# Patient Record
(demographics unavailable — no encounter records)

---

## 2025-03-19 NOTE — HISTORY OF PRESENT ILLNESS
[FreeTextEntry1] : 29 y/o female for annual mother dxed breast ca age 46 pt went for brca testing-results pedning will send genetic counselor told mary age 36 to start [N] : Patient does not use contraception [PapSmeardate] : today